# Patient Record
Sex: MALE | Race: WHITE | NOT HISPANIC OR LATINO | Employment: UNEMPLOYED | URBAN - METROPOLITAN AREA
[De-identification: names, ages, dates, MRNs, and addresses within clinical notes are randomized per-mention and may not be internally consistent; named-entity substitution may affect disease eponyms.]

---

## 2023-08-03 ENCOUNTER — APPOINTMENT (EMERGENCY)
Dept: RADIOLOGY | Facility: HOSPITAL | Age: 28
End: 2023-08-03
Payer: COMMERCIAL

## 2023-08-03 ENCOUNTER — HOSPITAL ENCOUNTER (EMERGENCY)
Facility: HOSPITAL | Age: 28
Discharge: HOME | End: 2023-08-03
Attending: EMERGENCY MEDICINE
Payer: COMMERCIAL

## 2023-08-03 VITALS
HEART RATE: 60 BPM | RESPIRATION RATE: 18 BRPM | WEIGHT: 209.4 LBS | SYSTOLIC BLOOD PRESSURE: 101 MMHG | DIASTOLIC BLOOD PRESSURE: 57 MMHG | OXYGEN SATURATION: 98 % | TEMPERATURE: 97.9 F

## 2023-08-03 DIAGNOSIS — L03.116 CELLULITIS OF LEFT FOOT: ICD-10-CM

## 2023-08-03 DIAGNOSIS — L03.115 CELLULITIS OF RIGHT FOOT: Primary | ICD-10-CM

## 2023-08-03 PROCEDURE — 73630 X-RAY EXAM OF FOOT: CPT

## 2023-08-03 PROCEDURE — 99284 EMERGENCY DEPT VISIT MOD MDM: CPT | Performed by: EMERGENCY MEDICINE

## 2023-08-03 PROCEDURE — 99282 EMERGENCY DEPT VISIT SF MDM: CPT

## 2023-08-03 RX ORDER — ACETAMINOPHEN 500 MG
500 TABLET ORAL EVERY 6 HOURS PRN
Qty: 30 TABLET | Refills: 0 | Status: SHIPPED | OUTPATIENT
Start: 2023-08-03

## 2023-08-03 RX ORDER — SULFAMETHOXAZOLE AND TRIMETHOPRIM 800; 160 MG/1; MG/1
1 TABLET ORAL ONCE
Status: COMPLETED | OUTPATIENT
Start: 2023-08-03 | End: 2023-08-03

## 2023-08-03 RX ORDER — ACETAMINOPHEN 325 MG/1
650 TABLET ORAL ONCE
Status: COMPLETED | OUTPATIENT
Start: 2023-08-03 | End: 2023-08-03

## 2023-08-03 RX ORDER — SULFAMETHOXAZOLE AND TRIMETHOPRIM 800; 160 MG/1; MG/1
1 TABLET ORAL 2 TIMES DAILY
Qty: 14 TABLET | Refills: 0 | Status: SHIPPED | OUTPATIENT
Start: 2023-08-03 | End: 2023-08-10

## 2023-08-03 RX ADMIN — ACETAMINOPHEN 650 MG: 325 TABLET ORAL at 22:00

## 2023-08-03 RX ADMIN — SULFAMETHOXAZOLE AND TRIMETHOPRIM 1 TABLET: 800; 160 TABLET ORAL at 22:00

## 2023-08-04 NOTE — ED PROVIDER NOTES
History  Chief Complaint   Patient presents with   • Wound Check     Pt came to ER with wounds on b/l feet and foot pain. Pt reports he was relapsed on drugs after being sober for six years. Pt has been injecting fentanyl into his feet. Pt reports he uses 3-4 bags per day. 25-year-old male with history of IV drug abuse presents complaining of bilateral redness of his feet and pain. Patient states that he recently relapsed approximately month ago and has been injecting into his toes and noticed redness about 3 or 4 days ago. Denies fevers. Denies any shortness of breath chest pain or calf pain. Patient states that he used  just prior to arrival. Denies any other complaints       History provided by:  Patient   used: No        Prior to Admission Medications   Prescriptions Last Dose Informant Patient Reported? Taking? Riboflavin 400 MG CAPS   No No   Sig: Take 400 mg by mouth daily. acetaminophen (TYLENOL) 325 mg tablet   No No   Sig: Take 2 tablets (650 mg total) by mouth every 6 (six) hours as needed for mild pain or fever. magnesium oxide (MAG-OX) 400 mg   No No   Sig: Take 1 tablet (400 mg total) by mouth daily. Facility-Administered Medications: None       Past Medical History:   Diagnosis Date   • Migraines    • Psychiatric disorder        Past Surgical History:   Procedure Laterality Date   • NO PAST SURGERIES         History reviewed. No pertinent family history. I have reviewed and agree with the history as documented. E-Cigarette/Vaping     E-Cigarette/Vaping Substances     Social History     Tobacco Use   • Smoking status: Every Day     Packs/day: 0.50     Types: Cigarettes     Passive exposure: Yes   Substance Use Topics   • Alcohol use: No   • Drug use: Yes     Types: Fentanyl     Comment: 3-4 bags per day IV       Review of Systems   Constitutional: Negative. Negative for chills and fatigue. HENT: Negative for ear pain and sore throat.     Eyes: Negative for photophobia and redness. Respiratory: Negative for apnea, cough and shortness of breath. Cardiovascular: Negative for chest pain. Gastrointestinal: Negative for abdominal pain, nausea and vomiting. Genitourinary: Negative for dysuria. Musculoskeletal: Negative for arthralgias, neck pain and neck stiffness. Skin: Positive for wound. Negative for rash. Neurological: Negative for dizziness, tremors, syncope and weakness. Psychiatric/Behavioral: Negative for suicidal ideas. Physical Exam  Physical Exam  Constitutional:       General: He is not in acute distress. Appearance: He is well-developed. He is not diaphoretic. Eyes:      Pupils: Pupils are equal, round, and reactive to light. Cardiovascular:      Rate and Rhythm: Normal rate and regular rhythm. Pulmonary:      Effort: Pulmonary effort is normal. No respiratory distress. Breath sounds: Normal breath sounds. Abdominal:      General: Bowel sounds are normal. There is no distension. Palpations: Abdomen is soft. Musculoskeletal:         General: Normal range of motion. Cervical back: Normal range of motion and neck supple. Skin:     General: Skin is warm and dry. Findings: Erythema present. Comments: Bilateral erythema with mild edema noted as well as multiple track marks from prior injections. No crepitus. Dorsalis pedis pulse 2+. Redness does not extend past the foot. Neurological:      Mental Status: He is alert and oriented to person, place, and time.          Vital Signs  ED Triage Vitals [08/03/23 2109]   Temperature Pulse Respirations Blood Pressure SpO2   97.9 °F (36.6 °C) 90 20 104/70 94 %      Temp Source Heart Rate Source Patient Position - Orthostatic VS BP Location FiO2 (%)   Tympanic Monitor Sitting Left arm --      Pain Score       --           Vitals:    08/03/23 2109   BP: 104/70   Pulse: 90   Patient Position - Orthostatic VS: Sitting         Visual Acuity      ED Medications  Medications   sulfamethoxazole-trimethoprim (BACTRIM DS) 800-160 mg per tablet 1 tablet (1 tablet Oral Given 8/3/23 2200)   acetaminophen (TYLENOL) tablet 650 mg (650 mg Oral Given 8/3/23 2200)       Diagnostic Studies  Results Reviewed     None                 XR foot 3+ views RIGHT   ED Interpretation by Marc Field PA-C (08/03 2259)   No obvious evidence of deep space infection      XR foot 3+ views LEFT   ED Interpretation by Marc Field PA-C (08/03 2259)   No obvious evidence of deep space infection                 Procedures  Procedures         ED Course                               SBIRT 22yo+    Flowsheet Row Most Recent Value   Initial Alcohol Screen: US AUDIT-C     1. How often do you have a drink containing alcohol? 0 Filed at: 08/03/2023 2203   2. How many drinks containing alcohol do you have on a typical day you are drinking? 0 Filed at: 08/03/2023 2203   3a. Male UNDER 65: How often do you have five or more drinks on one occasion? 0 Filed at: 08/03/2023 2203   Audit-C Score 0 Filed at: 08/03/2023 2203   JERZY: How many times in the past year have you. .. Used an illegal drug or used a prescription medication for non-medical reasons? Daily or Almost Daily Filed at: 08/03/2023 2203                    Medical Decision Making  Patient had evidence of bilateral cellulitis. Necrotizing infection was considered however no air found on x-ray. Low concern for osteomyelitis with negative x-ray and superficial wounds. Started on Bactrim for suspected MRSA. Patient did not meet SIRS criteria, no clear role for further evaluation. Patient was educated on supportive care. Warm handoff placed for substance abuse. Given return precautions. Discharged home. Amount and/or Complexity of Data Reviewed  Radiology: ordered. Risk  OTC drugs. Prescription drug management.           Disposition  Final diagnoses:   Cellulitis of right foot   Cellulitis of left foot     Time reflects when diagnosis was documented in both MDM as applicable and the Disposition within this note     Time User Action Codes Description Comment    8/3/2023 11:00 PM Sheila Lee [I95.17] Cellulitis     8/3/2023 11:00  [address] [E15.19] Cellulitis     8/3/2023 11:00 PM Ronnie Flynn Add [J01.445] Cellulitis of right foot     8/3/2023 11:00 PM Ronnie Flynn Add [O24.546] Cellulitis of left foot       ED Disposition     ED Disposition   Discharge    Condition   Stable    Date/Time   Thu Aug 3, 2023 11:00 PM    [facility] discharge to home/self care. Follow-up Information     Follow up With Specialties Details Why Contact Info Additional [address] [facility] Emergency Department Emergency Medicine Go to  If symptoms worsen [address] [phone] 4727 [facility] Emergency Department          Patient's Medications   Discharge Prescriptions    ACETAMINOPHEN (TYLENOL) 500 MG TABLET    Take 1 tablet (500 mg total) by mouth every 6 (six) hours as needed for moderate pain       Start Date: 8/3/2023  End Date: --       Order Dose: 500 mg       Quantity: 30 tablet    Refills: 0    SULFAMETHOXAZOLE-TRIMETHOPRIM (BACTRIM DS) 800-160 MG PER TABLET    Take 1 tablet by mouth 2 (two) times a day for 7 days smx-tmp DS (BACTRIM) 800-160 mg tabs (1tab q12 D10)       Start Date: 8/3/2023  End Date: 8/10/2023       Order Dose: 1 tablet       Quantity: 14 tablet    Refills: 0       No discharge procedures on file.     PDMP Review     None          ED Provider  Electronically Signed by           Shayne Houser PA-C  08/03/23 2963

## 2023-08-04 NOTE — DISCHARGE INSTRUCTIONS
Take medications as directed. Complete entire course. Return for new or worsening complaints including pain and fever. Injecting into this area will cause worsening of your infection and can prolong the course of your infection.

## 2023-08-06 NOTE — ED CARE HANDOFF
[address] [facility] Warm Handoff Outcome Note    Patient name Yonathan Alvarez  Location ED 06/ED 06 MRN [id number]  Age: 25 y.o. Plan Type:   Warm Handoff                                                                                    Plan Date: 8/6/2023  Service:  ED Warm Handoff      Substance Use History:  Fentanyl    Warm Handoff Update:  Pt discharged    Warm Handoff Outcome: Patient Refused

## 2023-08-30 ENCOUNTER — HOSPITAL ENCOUNTER (EMERGENCY)
Facility: HOSPITAL | Age: 28
Discharge: HOME | End: 2023-08-30
Attending: EMERGENCY MEDICINE
Payer: COMMERCIAL

## 2023-08-30 VITALS
HEART RATE: 70 BPM | SYSTOLIC BLOOD PRESSURE: 122 MMHG | RESPIRATION RATE: 18 BRPM | OXYGEN SATURATION: 100 % | TEMPERATURE: 97 F | DIASTOLIC BLOOD PRESSURE: 77 MMHG

## 2023-08-30 DIAGNOSIS — Z51.89 VISIT FOR WOUND CHECK: Primary | ICD-10-CM

## 2023-08-30 PROCEDURE — 99282 EMERGENCY DEPT VISIT SF MDM: CPT

## 2023-08-30 PROCEDURE — 99283 EMERGENCY DEPT VISIT LOW MDM: CPT | Performed by: EMERGENCY MEDICINE

## 2023-08-30 RX ORDER — GINSENG 100 MG
1 CAPSULE ORAL 2 TIMES DAILY
Qty: 28 G | Refills: 0 | Status: SHIPPED | OUTPATIENT
Start: 2023-08-30

## 2023-08-30 NOTE — ED PROVIDER NOTES
History  Chief Complaint   Patient presents with   • Wound Check     Pt with wound on medial side right foot. Pt reports he has had 2 course of abx for wound and it is improving but the wound hasn't closed yet. Pt interested in would care services. 26 yo M here with multiple feet wounds, s/p antibiotics here for wound check. No new pain or drainage. Also notes needs help getting off opioids. Recently relapsed after being clean for 6 years, placed on methadone but wants to get off - went to rehab but says second day off of methadone, had intense vomiting and was admitted to [address] [place] [address]          Prior to Admission Medications   Prescriptions Last Dose Informant Patient Reported? Taking? Riboflavin 400 MG CAPS   No No   Sig: Take 400 mg by mouth daily. acetaminophen (TYLENOL) 325 mg tablet   No No   Sig: Take 2 tablets (650 mg total) by mouth every 6 (six) hours as needed for mild pain or fever. acetaminophen (TYLENOL) 500 mg tablet   No No   Sig: Take 1 tablet (500 mg total) by mouth every 6 (six) hours as needed for moderate pain   magnesium oxide (MAG-OX) 400 mg   No No   Sig: Take 1 tablet (400 mg total) by mouth daily. Facility-Administered Medications: None       Past Medical History:   Diagnosis Date   • Migraines    • Psychiatric disorder        Past Surgical History:   Procedure Laterality Date   • NO PAST SURGERIES         History reviewed. No pertinent family history. I have reviewed and agree with the history as documented. E-Cigarette/Vaping     E-Cigarette/Vaping Substances     Social History     Tobacco Use   • Smoking status: Every Day     Packs/day: 0.50     Types: Cigarettes     Passive exposure: Yes   Substance Use Topics   • Alcohol use: No   • Drug use: Not Currently     Types: Fentanyl     Comment: hx of IV drug use, now on methadone       Review of Systems   Constitutional: Negative for chills and fever. HENT: Negative for ear pain and sore throat.     Eyes: Negative for pain and visual disturbance. Respiratory: Negative for cough and shortness of breath. Cardiovascular: Negative for chest pain and palpitations. Gastrointestinal: Negative for abdominal pain and vomiting. Genitourinary: Negative for dysuria and hematuria. Musculoskeletal: Negative for arthralgias and back pain. Skin: Positive for wound. Negative for color change and rash. Neurological: Negative for seizures and syncope. All other systems reviewed and are negative. Physical Exam  Physical Exam  Vitals and nursing note reviewed. Constitutional:       General: He is not in acute distress. Appearance: He is well-developed. HENT:      Head: Normocephalic and atraumatic. Eyes:      Conjunctiva/sclera: Conjunctivae normal.   Cardiovascular:      Rate and Rhythm: Normal rate and regular rhythm. Heart sounds: No murmur heard. Pulmonary:      Effort: Pulmonary effort is normal. No respiratory distress. Breath sounds: Normal breath sounds. Abdominal:      Palpations: Abdomen is soft. Tenderness: There is no abdominal tenderness. Musculoskeletal:         General: No swelling. Cervical back: Neck supple. Comments: Bilateral dorsal feet ulcerations that appear nonpurulent, nonerythematous. Skin:     General: Skin is warm and dry. Capillary Refill: Capillary refill takes less than 2 seconds. Neurological:      Mental Status: He is alert.    Psychiatric:         Mood and Affect: Mood normal.         Vital Signs  ED Triage Vitals [08/30/23 1817]   Temperature Pulse Respirations Blood Pressure SpO2   (!) 97 °F (36.1 °C) 70 18 122/77 100 %      Temp Source Heart Rate Source Patient Position - Orthostatic VS BP Location FiO2 (%)   Tympanic Monitor Sitting Left arm --      Pain Score       No Pain           Vitals:    08/30/23 1817   BP: 122/77   Pulse: 70   Patient Position - Orthostatic VS: Sitting         Visual Acuity      ED Medications  Medications - No data to display    Diagnostic Studies  Results Reviewed     None                 No orders to display              Procedures  Procedures         ED Course  ED Course as of 08/30/23 1921   Wed Aug 30, 2023   1907 Patient notes that he is on methadone and wants to get off and get on Suboxone. Recently went to rehab but after not taking methadone for 1 day had severe vomiting and had to be admitted to [facility] care stepdown unit. Continues to be on methadone, last dose today. Medical Decision Making  Patient with wounds that appear to be not acutely infected. Applied cream to encourage healing, wound care consult placed. Reached out to detox PA Charles Strong to see if he is a good candidate for inpatient detox but patient soon left secondary to having his car towed. Risk  OTC drugs. Disposition  Final diagnoses:   Visit for wound check     Time reflects when diagnosis was documented in both MDM as applicable and the Disposition within this note     Time User Action Codes Description Comment    8/30/2023  6:40 PM Mingo Khan Esperanza [Z51.89] Visit for wound check       ED Disposition     ED Disposition   Discharge    Condition   Stable    Date/Time   Wed Aug 30, 2023  6:40 PM    Comment   Anahi Huston discharge to home/self care.                Follow-up Information     Follow up With Specialties Details Why Contact Info Additional [facility]   [address] [id number]  [address] [facility], [address] [place] [address], [address] [place] [address]          Patient's Medications   Discharge Prescriptions    BACITRACIN TOPICAL OINTMENT 500 UNITS/G TOPICAL OINTMENT    Apply 1 large application topically 2 (two) times a day       Start Date: 8/30/2023 End Date: --       Order Dose: 1 large application       Quantity: 28 g    Refills: 0           PDMP Review     None          ED Provider  Electronically Signed by           Lloyd Martin MD  08/30/23 6434

## 2023-08-31 NOTE — ED CARE HANDOFF
[address] [facility] Warm Handoff Outcome Note    Patient name Cesar Woods  Location ED 03/ED 03 MRN [id number]  Age: 25 y.o. Plan Type: Warm Handoff                                                                                    Plan Date: 8/30/2023  Service:  ED Warm Handoff      Substance Use History:  Heroin    Warm Handoff Update:  Discharged, HOST to follow up with patient.     Warm Handoff Outcome: Treatment Related Resources

## 2023-10-13 ENCOUNTER — HOSPITAL ENCOUNTER (EMERGENCY)
Facility: HOSPITAL | Age: 28
Discharge: HOME | End: 2023-10-13
Attending: EMERGENCY MEDICINE
Payer: COMMERCIAL

## 2023-10-13 VITALS
SYSTOLIC BLOOD PRESSURE: 154 MMHG | TEMPERATURE: 97.4 F | RESPIRATION RATE: 16 BRPM | OXYGEN SATURATION: 100 % | DIASTOLIC BLOOD PRESSURE: 88 MMHG | HEART RATE: 76 BPM

## 2023-10-13 DIAGNOSIS — F11.93 OPIOID WITHDRAWAL (HCC): Primary | ICD-10-CM

## 2023-10-13 PROCEDURE — 99284 EMERGENCY DEPT VISIT MOD MDM: CPT | Performed by: EMERGENCY MEDICINE

## 2023-10-13 RX ORDER — CLONIDINE HYDROCHLORIDE 0.1 MG/1
0.2 TABLET ORAL ONCE
Status: COMPLETED | OUTPATIENT
Start: 2023-10-13 | End: 2023-10-13

## 2023-10-13 RX ADMIN — CLONIDINE HYDROCHLORIDE 0.2 MG: 0.1 TABLET ORAL at 15:05

## 2023-10-13 NOTE — ED PROVIDER NOTES
History  Chief Complaint   Patient presents with    Detox Evaluation     Patient arrives reporting w/d from Xylazine, fentanyl, heroin, and benzos; last used last night; in custody of police; Methadone maintenance, missed 2 days     HPI  Patient is a 59-year-old male with history of drug abuse presenting with withdrawal symptoms. Patient states that he uses xylazine, fentanyl, heroin as well as benzos. His last use was last night around 11 PM.  Patient states that he was on methadone but missed the past 2 days. Currently experiencing tremors, diaphoresis, congestion. Also feeling nauseous and had episodes of diarrhea. Reports no fever, chills, chest pain, shortness of breath, abdominal pain. Prior to Admission Medications   Prescriptions Last Dose Informant Patient Reported? Taking?   acetaminophen (TYLENOL) 500 mg tablet   No No   Sig: Take 1 tablet (500 mg total) by mouth every 6 (six) hours as needed for moderate pain   bacitracin topical ointment 500 units/g topical ointment   No No   Sig: Apply 1 large application topically 2 (two) times a day      Facility-Administered Medications: None       Past Medical History:   Diagnosis Date    Migraines     Psychiatric disorder        Past Surgical History:   Procedure Laterality Date    NO PAST SURGERIES         History reviewed. No pertinent family history. I have reviewed and agree with the history as documented. E-Cigarette/Vaping     E-Cigarette/Vaping Substances     Social History     Tobacco Use    Smoking status: Every Day     Packs/day: 0.50     Types: Cigarettes     Passive exposure: Yes   Substance Use Topics    Alcohol use: No    Drug use: Yes     Types: Fentanyl, Heroin, Benzodiazepines, Other       Review of Systems   Constitutional:  Positive for diaphoresis. Negative for chills, fever and unexpected weight change. HENT:  Negative for ear pain and sore throat. Eyes:  Negative for visual disturbance.    Respiratory:  Negative for cough, chest tightness and shortness of breath. Cardiovascular:  Negative for chest pain and leg swelling. Gastrointestinal:  Positive for diarrhea and nausea. Negative for abdominal distention, abdominal pain, constipation and vomiting. Endocrine: Negative. Genitourinary:  Negative for difficulty urinating and dysuria. Musculoskeletal: Negative. Skin: Negative. Allergic/Immunologic: Negative. Neurological:  Positive for tremors. Hematological: Negative. Psychiatric/Behavioral: Negative. All other systems reviewed and are negative. Physical Exam  Physical Exam  Vitals and nursing note reviewed. Constitutional:       General: He is not in acute distress. Appearance: Normal appearance. He is diaphoretic. He is not ill-appearing. HENT:      Head: Normocephalic and atraumatic. Right Ear: External ear normal.      Left Ear: External ear normal.      Nose: Nose normal.      Mouth/Throat:      Mouth: Mucous membranes are moist.      Pharynx: Oropharynx is clear. Eyes:      General: No scleral icterus. Right eye: No discharge. Left eye: No discharge. Extraocular Movements: Extraocular movements intact. Conjunctiva/sclera: Conjunctivae normal.      Pupils: Pupils are equal, round, and reactive to light. Cardiovascular:      Rate and Rhythm: Normal rate and regular rhythm. Pulses: Normal pulses. Heart sounds: Normal heart sounds. Pulmonary:      Effort: Pulmonary effort is normal.      Breath sounds: Normal breath sounds. Abdominal:      General: Abdomen is flat. Bowel sounds are normal. There is no distension. Palpations: Abdomen is soft. Tenderness: There is no abdominal tenderness. There is no guarding or rebound. Musculoskeletal:         General: Normal range of motion. Cervical back: Normal range of motion and neck supple. Skin:     General: Skin is warm.       Capillary Refill: Capillary refill takes less than 2 seconds. Neurological:      General: No focal deficit present. Mental Status: He is alert and oriented to person, place, and time. Mental status is at baseline. Psychiatric:         Mood and Affect: Mood normal.         Behavior: Behavior normal.         Thought Content: Thought content normal.         Judgment: Judgment normal.         Vital Signs  ED Triage Vitals [10/13/23 1439]   Temperature Pulse Respirations Blood Pressure SpO2   (!) 97.4 °F (36.3 °C) 70 12 93/52 100 %      Temp Source Heart Rate Source Patient Position - Orthostatic VS BP Location FiO2 (%)   Oral Monitor Lying Left arm --      Pain Score       No Pain           Vitals:    10/13/23 1439 10/13/23 1500 10/13/23 1530   BP: 93/52 123/75 154/88   Pulse: 70 73 76   Patient Position - Orthostatic VS: Lying Lying Lying         Visual Acuity      ED Medications  Medications   cloNIDine (CATAPRES) tablet 0.2 mg (0.2 mg Oral Given 10/13/23 1505)       Diagnostic Studies  Results Reviewed       None                   No orders to display              Procedures  Procedures         ED Course                               SBIRT 22yo+      Flowsheet Row Most Recent Value   Initial Alcohol Screen: US AUDIT-C     1. How often do you have a drink containing alcohol? 0 Filed at: 10/13/2023 1440   2. How many drinks containing alcohol do you have on a typical day you are drinking? 0 Filed at: 10/13/2023 1440   3a. Male UNDER 65: How often do you have five or more drinks on one occasion? 0 Filed at: 10/13/2023 1440   3b. FEMALE Any Age, or MALE 65+: How often do you have 4 or more drinks on one occassion? 0 Filed at: 10/13/2023 1440   Audit-C Score 0 Filed at: 10/13/2023 1440   JERZY: How many times in the past year have you. .. Used an illegal drug or used a prescription medication for non-medical reasons? Daily or Almost Daily Filed at: 10/13/2023 1440   DAST-10: In the past 12 months. ..    1. Have you used drugs other than those required for medical reasons? 1 Filed at: 10/13/2023 1440   2. Do you use more than one drug at a time? 1 Filed at: 10/13/2023 1440   3. Have you had medical problems as a result of your drug use (e.g., memory loss, hepatitis, convulsions, bleeding, etc.)? 1 Filed at: 10/13/2023 1440   4. Have you had "blackouts" or "flashbacks" as a result of drug use? YesNo 1 Filed at: 10/13/2023 1440   5. Do you ever feel bad or guilty about your drug use? 0 Filed at: 10/13/2023 1440   6. Does your spouse (or parent) ever complain about your involvement with drugs? 0 Filed at: 10/13/2023 1440   7. Have you neglected your family because of your use of drugs? 0 Filed at: 10/13/2023 1440   8. Have you engaged in illegal activities in order to obtain drugs? 0 Filed at: 10/13/2023 1440   9. Have you ever experienced withdrawal symptoms (felt sick) when you stopped taking drugs? 1 Filed at: 10/13/2023 1440   10. Are you always able to stop using drugs when you want to? 1 Filed at: 10/13/2023 1440   DAST-10 Score 6 Filed at: 10/13/2023 1440                      Medical Decision Making   Patient is a 55-year-old male presenting with opioid withdrawal symptoms  Patient is diaphoretic as well as have congestion  Patient given clonidine with complete symptom resolution  Patient was stable for incarceration  Patient discharged    Problems Addressed:  Opioid withdrawal [facility]): acute illness or injury    Risk  Prescription drug management. Disposition  Final diagnoses:   Opioid withdrawal ([address])     Time reflects when diagnosis was documented in both MDM as applicable and the Disposition within this note       Time User Action Codes Description Comment    10/13/2023  3:42 PM Majo Mata [F11.93] Opioid withdrawal [facility])           ED Disposition       ED Disposition   Discharge    Condition   Stable    Date/Time   Fri Oct 13, 2023 [place] discharge to home/self care.                    Follow-up Information       Follow up With Specialties Details Why Contact Info Additional [address] [facility] Emergency Department Emergency Medicine Go to  If symptoms worsen [address] [facility] [phone] [address] [facility] Emergency Department            Discharge Medication List as of 10/13/2023  3:52 PM        CONTINUE these medications which have NOT CHANGED    Details   acetaminophen (TYLENOL) 500 mg tablet Take 1 tablet (500 mg total) by mouth every 6 (six) hours as needed for moderate pain, Starting Thu 8/3/2023, Normal      bacitracin topical ointment 500 units/g topical ointment Apply 1 large application topically 2 (two) times a day, Starting Wed 8/30/2023, Normal             No discharge procedures on file.     PDMP Review         Value Time User    PDMP Reviewed  Yes 8/30/2023  7:32 PM Palma Saleh PA-C            ED Provider  Electronically Signed by             Jack Thomason MD  10/13/23 2002

## 2024-01-10 ENCOUNTER — HOSPITAL ENCOUNTER (EMERGENCY)
Facility: HOSPITAL | Age: 29
Discharge: HOME | End: 2024-01-10
Attending: EMERGENCY MEDICINE
Payer: COMMERCIAL

## 2024-01-10 VITALS
DIASTOLIC BLOOD PRESSURE: 83 MMHG | HEART RATE: 106 BPM | SYSTOLIC BLOOD PRESSURE: 147 MMHG | TEMPERATURE: 98.5 F | RESPIRATION RATE: 18 BRPM | WEIGHT: 200.5 LBS | OXYGEN SATURATION: 96 %

## 2024-01-10 DIAGNOSIS — R42 LIGHTHEADEDNESS: Primary | ICD-10-CM

## 2024-01-10 DIAGNOSIS — M54.9 BACK PAIN: ICD-10-CM

## 2024-01-10 PROCEDURE — 99283 EMERGENCY DEPT VISIT LOW MDM: CPT

## 2024-01-11 NOTE — ED PROVIDER NOTES
"History  Chief Complaint   Patient presents with    Dizziness     Pt reports he had a fall on 1/4 and since then has been experiencing light headedness when he stands and \"my vision gets darker and constricted\".      The patient is a 28-year-old male with a past medical history of migraines, who presents for evaluation of lightheadedness.  Per record review he was seen at [facility] on 1/4 after a slip on water with head strike and LOC.  Imaging of the head, c-spine, and t-spine were negative at that time.  He reports developing neck pain and stiffness, back pain, headaches, lightheadedness, and a visual disturbance since that time.  He explains when he stands up after sitting/laying for several minutes he experiences lightheadedness and \"the room closes in around me\".  No paresthesias, saddle anesthesia, extremity weakness, bowel/bladder incontinence, nausea, or vomiting.  The patient is scheduled to follow up with neurology at [facility] on Friday.        Prior to Admission Medications   Prescriptions Last Dose Informant Patient Reported? Taking?   acetaminophen (TYLENOL) 500 mg tablet   No No   Sig: Take 1 tablet (500 mg total) by mouth every 6 (six) hours as needed for moderate pain   bacitracin topical ointment 500 units/g topical ointment   No No   Sig: Apply 1 large application topically 2 (two) times a day      Facility-Administered Medications: None       Past Medical History:   Diagnosis Date    Migraines     Psychiatric disorder        Past Surgical History:   Procedure Laterality Date    NO PAST SURGERIES         History reviewed. No pertinent family history.  I have reviewed and agree with the history as documented.    E-Cigarette/Vaping     E-Cigarette/Vaping Substances     Social History     Tobacco Use    Smoking status: Every Day     Current packs/day: 0.50     Types: Cigarettes     Passive exposure: Yes   Substance Use Topics    Alcohol use: No    Drug use: Yes     Types: Fentanyl, Heroin, Benzodiazepines, " Other       Review of Systems   Constitutional:  Negative for chills and fever.   HENT:  Negative for ear pain and sore throat.    Eyes:  Positive for visual disturbance. Negative for pain.   Respiratory:  Negative for cough and shortness of breath.    Cardiovascular:  Negative for chest pain and palpitations.   Gastrointestinal:  Negative for abdominal pain, diarrhea, nausea and vomiting.   Genitourinary:  Negative for dysuria and hematuria.        -Incontinence   Musculoskeletal:  Positive for neck pain and neck stiffness. Negative for arthralgias and back pain.   Skin:  Negative for color change and rash.   Neurological:  Positive for light-headedness and headaches. Negative for dizziness, seizures, syncope, weakness and numbness.        - Saddle anesthesia   All other systems reviewed and are negative.      Physical Exam  Physical Exam  Vitals and nursing note reviewed.   Constitutional:       General: He is awake.      Appearance: Normal appearance. He is well-developed and normal weight. He is not toxic-appearing or diaphoretic.   HENT:      Head: Normocephalic and atraumatic. No raccoon eyes, Weiner's sign, abrasion, contusion or laceration.      Right Ear: Tympanic membrane, ear canal and external ear normal. No hemotympanum.      Left Ear: Tympanic membrane, ear canal and external ear normal. No hemotympanum.      Nose: Nose normal. No nasal deformity.      Right Nostril: No epistaxis or septal hematoma.      Left Nostril: No epistaxis or septal hematoma.      Mouth/Throat:      Lips: Pink.      Mouth: Mucous membranes are moist.      Dentition: Abnormal dentition.      Tongue: Tongue does not deviate from midline.      Pharynx: Oropharynx is clear. Uvula midline.      Comments: Recently repaired dental fractures on the central incisors.  Eyes:      General: Lids are normal. Vision grossly intact. Gaze aligned appropriately. No visual field deficit.     Extraocular Movements: Extraocular movements intact.       Right eye: No nystagmus.      Left eye: No nystagmus.      Conjunctiva/sclera: Conjunctivae normal.      Pupils: Pupils are equal, round, and reactive to light.      Visual Fields: Right eye visual fields normal and left eye visual fields normal.   Neck:      Meningeal: Brudzinski's sign absent.   Cardiovascular:      Rate and Rhythm: Normal rate and regular rhythm.      Heart sounds: S1 normal and S2 normal. No murmur heard.     No friction rub. No gallop.   Pulmonary:      Effort: Pulmonary effort is normal. No respiratory distress.      Breath sounds: Normal breath sounds and air entry. No wheezing, rhonchi or rales.   Chest:      Chest wall: No tenderness.   Abdominal:      General: Abdomen is flat.      Palpations: Abdomen is soft.      Tenderness: There is no abdominal tenderness. There is no guarding or rebound.   Musculoskeletal:      Cervical back: Full passive range of motion without pain and neck supple. No rigidity or crepitus. Spinous process tenderness present. No muscular tenderness.      Thoracic back: Bony tenderness present. No deformity, spasms or tenderness.      Lumbar back: No spasms, tenderness or bony tenderness.      Comments: Tenderness to palpation of the c-spine and t-spine with minimal tenderness of the paraspinal musculature.  No palpable spasms, step-off, or crepitus.  Full AROM and 5/5 in all extremities.  Raising the left leg reproduces thoracic back pain, but there is no tenderness in the lumbar region.  Sensation is intact.  2+ distal pulses.   Lymphadenopathy:      Cervical: No cervical adenopathy.   Skin:     General: Skin is warm and dry.      Capillary Refill: Capillary refill takes less than 2 seconds.      Coloration: Skin is not pale.      Findings: No abrasion, bruising, ecchymosis, erythema, signs of injury, laceration, rash or wound.   Neurological:      General: No focal deficit present.      Mental Status: He is alert and oriented to person, place, and time.       GCS: GCS eye subscore is 4. GCS verbal subscore is 5. GCS motor subscore is 6.      Cranial Nerves: Cranial nerves 2-12 are intact. No dysarthria or facial asymmetry.      Sensory: Sensation is intact.      Motor: Motor function is intact. No weakness, abnormal muscle tone or pronator drift.      Coordination: Coordination is intact. Romberg sign negative. Finger-Nose-Finger Test normal.      Gait: Gait is intact.   Psychiatric:         Speech: Speech normal.         Behavior: Behavior is cooperative.         Vital Signs  ED Triage Vitals [01/10/24 1947]   Temperature Pulse Respirations Blood Pressure SpO2   98.5 °F (36.9 °C) (!) 106 18 147/83 96 %      Temp Source Heart Rate Source Patient Position - Orthostatic VS BP Location FiO2 (%)   Tympanic Monitor Sitting Left arm --      Pain Score       --           Vitals:    01/10/24 1947   BP: 147/83   Pulse: (!) 106   Patient Position - Orthostatic VS: Sitting         Visual Acuity  Visual Acuity      Flowsheet Row Most Recent Value   L Pupil Size (mm) 3   R Pupil Size (mm) 3            ED Medications  Medications - No data to display    Diagnostic Studies  Results Reviewed       None            CT Head on 1/4/24  IMPRESSION:   No acute intracranial abnormality. Specifically, no acute intracranial   hemorrhage or large acute territorial infarction.       CT T-Spine on 1/4/24  IMPRESSION: No acute fracture or dislocation.           MRI C-Spine on 1/5/24  Impression:  No MRI evidence of traumatic injury or other significant pathology of the   cervical spine identified.         Procedures  Procedures         ED Course           Medical Decision Making  Patient presents with lightheadedness, headaches, and persistent neck/upper back pain after hitting his head on a counter on 1/4.  He is alert and oriented x 4 with a GCS of 15 and no focal neurologic deficits.  There is tenderness to palpation of the C-spine and upper T-spine, but no step-offs or vertebral deformities.   All extremities are neurovascularly intact.  Differential diagnosis includes but is not limited to posttraumatic headache, tension headache, migraine, concussion syndrome, cervical radiculopathy, C-spine fracture, T-spine fracture, or musculoskeletal pain.  Reviewed imaging done at [facility] on 1/4 including CTs of the head, C-spine, and T-spine, as well as an MRI of the C-spine.  All imaging was negative for acute pathology.  Reassured the patient that his neurologic exam was normal today and  repeat imaging is not warranted at this time.  He can continue to take the anti-inflammatories and muscle relaxants for his back pain, and should follow-up with neurology on Friday.  Patient is in agreement with the treatment plan and all questions were answered.  Strict return precautions discussed and he verbalized understanding.  Follow-up with neurology as scheduled, but return to the ED in the interim with any new or worsening symptoms.    Problems Addressed:  Back pain: acute illness or injury  Lightheadedness: acute illness or injury    Amount and/or Complexity of Data Reviewed  External Data Reviewed: radiology and notes.             Disposition  Final diagnoses:   Lightheadedness   Back pain     Time reflects when diagnosis was documented in both MDM as applicable and the Disposition within this note       Time User Action Codes Description Comment    1/10/2024  8:22 PM Patti Zaragoza Add [R42] Lightheadedness     1/10/2024  8:23 PM Patti Zaragoza Add [M54.9] Back pain           ED Disposition       ED Disposition   Discharge    Condition   Stable    Date/Time   Wed Abdi 10, 2024  8:22 PM    Comment   Deniz Tate discharge to home/self care.                   Follow-up Information    None         Discharge Medication List as of 1/10/2024  8:23 PM        CONTINUE these medications which have NOT CHANGED    Details   acetaminophen (TYLENOL) 500 mg tablet Take 1 tablet (500 mg total) by mouth every 6 (six) hours as  needed for moderate pain, Starting Thu 8/3/2023, Normal      bacitracin topical ointment 500 units/g topical ointment Apply 1 large application topically 2 (two) times a day, Starting Wed 8/30/2023, Normal             No discharge procedures on file.    PDMP Review         Value Time User    PDMP Reviewed  Yes 8/30/2023  7:32 PM Cassandra Hernandez PA-C            ED Provider  Electronically Signed by             Patti Zaragoza PA-C  01/11/24 0413

## 2024-07-19 ENCOUNTER — HOSPITAL ENCOUNTER (EMERGENCY)
Facility: HOSPITAL | Age: 29
Discharge: HOME | End: 2024-07-19
Attending: EMERGENCY MEDICINE
Payer: COMMERCIAL

## 2024-07-19 VITALS
SYSTOLIC BLOOD PRESSURE: 121 MMHG | RESPIRATION RATE: 18 BRPM | WEIGHT: 195.99 LBS | OXYGEN SATURATION: 100 % | TEMPERATURE: 98.7 F | DIASTOLIC BLOOD PRESSURE: 86 MMHG | HEART RATE: 96 BPM

## 2024-07-19 DIAGNOSIS — F11.20 METHADONE DEPENDENCE (HCC): Primary | ICD-10-CM

## 2024-07-19 DIAGNOSIS — Z76.0 ENCOUNTER FOR MEDICATION REFILL: ICD-10-CM

## 2024-07-19 PROCEDURE — 99281 EMR DPT VST MAYX REQ PHY/QHP: CPT

## 2024-07-19 PROCEDURE — 99284 EMERGENCY DEPT VISIT MOD MDM: CPT

## 2024-07-19 RX ORDER — METHADONE HYDROCHLORIDE 10 MG/ML
120 CONCENTRATE ORAL ONCE
Status: COMPLETED | OUTPATIENT
Start: 2024-07-19 | End: 2024-07-19

## 2024-07-19 RX ADMIN — METHADONE HYDROCHLORIDE 120 MG: 10 CONCENTRATE ORAL at 10:05

## 2024-07-19 NOTE — ED PROVIDER NOTES
History  Chief Complaint   Patient presents with    Medication Refill     Patient requesting his methadone dose as he has no transportation to his clinic today     29 M PMH of opioid use disorder on methadone maintenance therapy presents to ED for dose of methadone. Unable to obtain transportation to methadone clinic. Last dose yesterday. Denies symptoms or opioid use.     [facility] DAREN Anderson.       History provided by:  Patient (clin)  Medication Refill      Prior to Admission Medications   Prescriptions Last Dose Informant Patient Reported? Taking?   acetaminophen (TYLENOL) 500 mg tablet   No No   Sig: Take 1 tablet (500 mg total) by mouth every 6 (six) hours as needed for moderate pain   bacitracin topical ointment 500 units/g topical ointment   No No   Sig: Apply 1 large application topically 2 (two) times a day      Facility-Administered Medications: None       Past Medical History:   Diagnosis Date    Migraines     Psychiatric disorder        Past Surgical History:   Procedure Laterality Date    NO PAST SURGERIES         History reviewed. No pertinent family history.  I have reviewed and agree with the history as documented.    E-Cigarette/Vaping     E-Cigarette/Vaping Substances     Social History     Tobacco Use    Smoking status: Every Day     Current packs/day: 0.50     Types: Cigarettes     Passive exposure: Yes   Substance Use Topics    Alcohol use: No    Drug use: Yes     Types: Fentanyl, Heroin, Benzodiazepines, Other       Review of Systems   All other systems reviewed and are negative.      Physical Exam  Physical Exam  Vitals and nursing note reviewed.   Constitutional:       General: He is awake. He is not in acute distress.     Appearance: Normal appearance.   HENT:      Head: Normocephalic.      Mouth/Throat:      Lips: Pink.      Mouth: Mucous membranes are moist.   Eyes:      General: Vision grossly intact.   Cardiovascular:      Rate and Rhythm: Normal rate and regular  rhythm.      Heart sounds: Normal heart sounds.   Pulmonary:      Effort: Pulmonary effort is normal. No respiratory distress.      Breath sounds: Normal breath sounds.   Abdominal:      General: There is no distension.   Skin:     General: Skin is warm and dry.   Neurological:      Mental Status: He is alert.      Gait: Gait normal.         Vital Signs  ED Triage Vitals   Temperature Pulse Respirations Blood Pressure SpO2   07/19/24 0933 07/19/24 0933 07/19/24 0933 07/19/24 0933 07/19/24 0933   98.7 °F (37.1 °C) 96 18 121/86 100 %      Temp Source Heart Rate Source Patient Position - Orthostatic VS BP Location FiO2 (%)   07/19/24 0933 07/19/24 0933 07/19/24 0933 07/19/24 0933 --   Oral Monitor Sitting Left arm       Pain Score       07/19/24 1005       Med Not Given for Pain - for MAR use only           Vitals:    07/19/24 0933   BP: 121/86   Pulse: 96   Patient Position - Orthostatic VS: Sitting         Visual Acuity      ED Medications  Medications   methadone (DOLOPHINE) oral concentrated solution 120 mg (120 mg Oral Given 7/19/24 1005)       Diagnostic Studies  Results Reviewed       None                   No orders to display              Procedures  Procedures         ED Course  ED Course as of 07/19/24 1012   Fri Jul 19, 2024   0950 Methadone verified by Luba at [facility]  Per Luba:  Last dose 7/18/24 @0926  120 mg     Candice Funk, RN                                   SBIRT 20yo+      Flowsheet Row Most Recent Value   Initial Alcohol Screen: US AUDIT-C     1. How often do you have a drink containing alcohol? 0 Filed at: 07/19/2024 0937   2. How many drinks containing alcohol do you have on a typical day you are drinking?  0 Filed at: 07/19/2024 0937   3a. Male UNDER 65: How often do you have five or more drinks on one occasion? 0 Filed at: 07/19/2024 0937   3b. FEMALE Any Age, or MALE 65+: How often do you have 4 or more drinks on one occassion? 0 Filed at: 07/19/2024 0937   Audit-C  "Score 0 Filed at: 07/19/2024 0937   JERZY: How many times in the past year have you...    Used an illegal drug or used a prescription medication for non-medical reasons? Never Filed at: 07/19/2024 0937                      Medical Decision Making  Ambulating steady gait, answering questions appropriately. Clinically sober. Last methadone dose given and dose confirmed by RN with methadone clinic [facility]. Last dose yesterday. One time dose given today patient informed he must follow up with his methadone clinic.    All imaging and/or lab testing discussed with patient, strict return to ED precautions discussed. Patient recommended to follow up promptly with appropriate outpatient provider and risk of morbidity/mortality if patient does not follow up as recommended was discussed. Patient and/or family members verbalizes understanding and agrees with plan. Patient and/or family members were given opportunity to ask questions, all questions were answered at this time. Patient is stable for discharge.     Portions of the record may have been created with voice recognition software. Occasional wrong word or \"sound a like\" substitutions may have occurred due to the inherent limitations of voice recognition software. Read the chart carefully and recognize, using context, where substitutions have occurred.       Risk  Prescription drug management.                 Disposition  Final diagnoses:   Methadone dependence (HCC)   Encounter for medication refill     Time reflects when diagnosis was documented in both MDM as applicable and the Disposition within this note       Time User Action Codes Description Comment    7/19/2024 10:01 AM Divya Kulkarni [F11.20] Methadone dependence (HCC)     7/19/2024 10:01 AM Divya Kulkarni [Z76.0] Encounter for medication refill           ED Disposition       ED Disposition   Discharge    Condition   Stable    Date/Time   Fri Jul 19, 2024 1001    Comment   Deniz Tate " discharge to home/self care.                   Follow-up Information       Follow up With Specialties Details Why Contact Info    [facility]                Patient's Medications   Discharge Prescriptions    No medications on file       No discharge procedures on file.    PDMP Review         Value Time User    PDMP Reviewed  Yes 8/30/2023  7:32 PM Cassandra Hernandez PA-C            ED Provider  Electronically Signed by             Divya Kulkarni PA-C  07/19/24 1012

## 2024-07-19 NOTE — ED NOTES
Methadone verified by Luba at [facility]  Per Luba:  Last dose 7/18/24 @0926  120 mg     Candice Funk RN  07/19/24 0937

## 2024-07-20 ENCOUNTER — HOSPITAL ENCOUNTER (EMERGENCY)
Facility: HOSPITAL | Age: 29
Discharge: HOME | End: 2024-07-20
Attending: EMERGENCY MEDICINE | Admitting: EMERGENCY MEDICINE
Payer: COMMERCIAL

## 2024-07-20 VITALS
TEMPERATURE: 98.3 F | SYSTOLIC BLOOD PRESSURE: 137 MMHG | OXYGEN SATURATION: 95 % | DIASTOLIC BLOOD PRESSURE: 110 MMHG | HEART RATE: 104 BPM | RESPIRATION RATE: 16 BRPM

## 2024-07-20 DIAGNOSIS — Z76.0 MEDICATION REFILL: Primary | ICD-10-CM

## 2024-07-20 PROCEDURE — 99281 EMR DPT VST MAYX REQ PHY/QHP: CPT

## 2024-07-20 PROCEDURE — 99283 EMERGENCY DEPT VISIT LOW MDM: CPT | Performed by: EMERGENCY MEDICINE

## 2024-07-20 RX ORDER — METHADONE HYDROCHLORIDE 10 MG/ML
120 CONCENTRATE ORAL ONCE
Status: COMPLETED | OUTPATIENT
Start: 2024-07-20 | End: 2024-07-20

## 2024-07-20 RX ADMIN — METHADONE HYDROCHLORIDE 120 MG: 10 CONCENTRATE ORAL at 13:35

## 2024-07-20 NOTE — ED NOTES
Methadone 120 mg confirmed with Vicky from  [facility]  Last dose at the clinic was 7/18/24     Candice Funk, RN  07/20/24 7782

## 2024-07-21 NOTE — ED PROVIDER NOTES
History  Chief Complaint   Patient presents with    Medication Refill     Requesting Methadone dose - seen yesterday for same      29-year-old male seeking methadone.  Having car troubles unable to get to his clinic.      Medication Refill      Prior to Admission Medications   Prescriptions Last Dose Informant Patient Reported? Taking?   acetaminophen (TYLENOL) 500 mg tablet   No No   Sig: Take 1 tablet (500 mg total) by mouth every 6 (six) hours as needed for moderate pain   bacitracin topical ointment 500 units/g topical ointment   No No   Sig: Apply 1 large application topically 2 (two) times a day      Facility-Administered Medications: None       Past Medical History:   Diagnosis Date    Migraines     Psychiatric disorder        Past Surgical History:   Procedure Laterality Date    NO PAST SURGERIES         History reviewed. No pertinent family history.  I have reviewed and agree with the history as documented.    E-Cigarette/Vaping     E-Cigarette/Vaping Substances     Social History     Tobacco Use    Smoking status: Every Day     Current packs/day: 0.50     Types: Cigarettes     Passive exposure: Yes   Substance Use Topics    Alcohol use: No    Drug use: Yes     Types: Fentanyl, Heroin, Benzodiazepines, Other       Review of Systems   Constitutional: Negative.  Negative for chills and fever.   HENT: Negative.  Negative for rhinorrhea, sore throat, trouble swallowing and voice change.    Eyes: Negative.  Negative for pain and visual disturbance.   Respiratory: Negative.  Negative for cough, shortness of breath and wheezing.    Cardiovascular: Negative.  Negative for chest pain and palpitations.   Gastrointestinal:  Negative for abdominal pain, diarrhea, nausea and vomiting.   Genitourinary: Negative.  Negative for dysuria and frequency.   Musculoskeletal: Negative.  Negative for neck pain and neck stiffness.   Skin: Negative.  Negative for rash.   Neurological: Negative.  Negative for dizziness, speech  difficulty, weakness, light-headedness and numbness.       Physical Exam  Physical Exam  Vitals and nursing note reviewed.   Constitutional:       General: He is not in acute distress.     Appearance: He is well-developed.   HENT:      Head: Normocephalic and atraumatic.   Eyes:      Conjunctiva/sclera: Conjunctivae normal.      Pupils: Pupils are equal, round, and reactive to light.   Neck:      Trachea: No tracheal deviation.   Cardiovascular:      Rate and Rhythm: Normal rate and regular rhythm.   Pulmonary:      Effort: Pulmonary effort is normal. No respiratory distress.      Breath sounds: Normal breath sounds. No wheezing or rales.   Abdominal:      General: Bowel sounds are normal. There is no distension.      Palpations: Abdomen is soft.      Tenderness: There is no abdominal tenderness. There is no guarding or rebound.   Musculoskeletal:         General: No tenderness or deformity. Normal range of motion.      Cervical back: Normal range of motion and neck supple.   Skin:     General: Skin is warm and dry.      Capillary Refill: Capillary refill takes less than 2 seconds.      Findings: No rash.   Neurological:      Mental Status: He is alert and oriented to person, place, and time.   Psychiatric:         Behavior: Behavior normal.         Vital Signs  ED Triage Vitals   Temperature Pulse Respirations Blood Pressure SpO2   07/20/24 1300 07/20/24 1300 07/20/24 1300 07/20/24 1300 07/20/24 1300   98.3 °F (36.8 °C) 104 16 (!) 137/110 95 %      Temp Source Heart Rate Source Patient Position - Orthostatic VS BP Location FiO2 (%)   07/20/24 1300 07/20/24 1300 07/20/24 1300 07/20/24 1300 --   Tympanic Monitor Sitting Right arm       Pain Score       07/20/24 1335       Med Not Given for Pain - for MAR use only           Vitals:    07/20/24 1300   BP: (!) 137/110   Pulse: 104   Patient Position - Orthostatic VS: Sitting         Visual Acuity      ED Medications  Medications   methadone (DOLOPHINE) oral concentrated  solution 120 mg (120 mg Oral Given 7/20/24 1335)       Diagnostic Studies  Results Reviewed       None                   No orders to display              Procedures  Procedures         ED Course                                               Medical Decision Making  Clinic call to confirm dosing, given methadone.  Patient states he will need 1 more day to get his car back and then will no longer be using the ER for his methadone.    Risk  Prescription drug management.                 Disposition  Final diagnoses:   Medication refill     Time reflects when diagnosis was documented in both MDM as applicable and the Disposition within this note       Time User Action Codes Description Comment    7/20/2024  1:38 PM Mati Conley Add [Z76.0] Medication refill           ED Disposition       ED Disposition   Discharge    Condition   Stable    Date/Time   Sat Jul 20, 2024  1:38 PM    Comment   Deniz Tate discharge to home/self care.                   Follow-up Information       Follow up With Specialties Details Why Contact Info Additional Information    [facility] Medicine In 1 week  [address] [place] [id number] [phone] [facility], [address] [place] [address], [id number] [phone]            Discharge Medication List as of 7/20/2024  1:45 PM        CONTINUE these medications which have NOT CHANGED    Details   acetaminophen (TYLENOL) 500 mg tablet Take 1 tablet (500 mg total) by mouth every 6 (six) hours as needed for moderate pain, Starting Thu 8/3/2023, Normal      bacitracin topical ointment 500 units/g topical ointment Apply 1 large application topically 2 (two) times a day, Starting Wed 8/30/2023, Normal             No discharge procedures on file.    PDMP Review         Value Time User    PDMP Reviewed  Yes 8/30/2023  7:32 PM Cassandra Hernandez PA-C            ED  Provider  Electronically Signed by             Mati Conley DO  07/21/24 0713

## 2024-08-30 ENCOUNTER — HOSPITAL ENCOUNTER (EMERGENCY)
Facility: HOSPITAL | Age: 29
Discharge: HOME | End: 2024-08-30
Attending: EMERGENCY MEDICINE
Payer: COMMERCIAL

## 2024-08-30 VITALS
TEMPERATURE: 98.8 F | SYSTOLIC BLOOD PRESSURE: 144 MMHG | OXYGEN SATURATION: 99 % | HEART RATE: 73 BPM | WEIGHT: 213.85 LBS | RESPIRATION RATE: 20 BRPM | DIASTOLIC BLOOD PRESSURE: 87 MMHG

## 2024-08-30 DIAGNOSIS — F11.20 METHADONE MAINTENANCE THERAPY PATIENT (HCC): ICD-10-CM

## 2024-08-30 DIAGNOSIS — F19.10 POLYSUBSTANCE ABUSE (HCC): Primary | ICD-10-CM

## 2024-08-30 DIAGNOSIS — F11.93 OPIOID WITHDRAWAL (HCC): ICD-10-CM

## 2024-08-30 LAB
AMPHETAMINES SERPL QL SCN: POSITIVE
BARBITURATES UR QL: NEGATIVE
BENZODIAZ UR QL: POSITIVE
COCAINE UR QL: POSITIVE
ETHANOL EXG-MCNC: 0 MG/DL
FENTANYL UR QL SCN: POSITIVE
HYDROCODONE UR QL SCN: NEGATIVE
METHADONE UR QL: POSITIVE
OPIATES UR QL SCN: POSITIVE
OXYCODONE+OXYMORPHONE UR QL SCN: NEGATIVE
PCP UR QL: NEGATIVE
THC UR QL: POSITIVE

## 2024-08-30 PROCEDURE — 80307 DRUG TEST PRSMV CHEM ANLYZR: CPT

## 2024-08-30 PROCEDURE — 99282 EMERGENCY DEPT VISIT SF MDM: CPT

## 2024-08-30 PROCEDURE — 82075 ASSAY OF BREATH ETHANOL: CPT

## 2024-08-30 PROCEDURE — 99285 EMERGENCY DEPT VISIT HI MDM: CPT

## 2024-08-30 PROCEDURE — NC001 PR NO CHARGE: Performed by: EMERGENCY MEDICINE

## 2024-08-30 RX ORDER — ONDANSETRON 4 MG/1
4 TABLET, ORALLY DISINTEGRATING ORAL ONCE
Status: DISCONTINUED | OUTPATIENT
Start: 2024-08-30 | End: 2024-08-30 | Stop reason: HOSPADM

## 2024-08-30 RX ORDER — LORAZEPAM 1 MG/1
1 TABLET ORAL ONCE
Status: COMPLETED | OUTPATIENT
Start: 2024-08-30 | End: 2024-08-30

## 2024-08-30 RX ORDER — METHADONE HYDROCHLORIDE 10 MG/ML
125 CONCENTRATE ORAL EVERY MORNING
Status: DISCONTINUED | OUTPATIENT
Start: 2024-08-30 | End: 2024-08-30 | Stop reason: HOSPADM

## 2024-08-30 RX ORDER — LOPERAMIDE HCL 2 MG
2 CAPSULE ORAL ONCE
Status: DISCONTINUED | OUTPATIENT
Start: 2024-08-30 | End: 2024-08-30 | Stop reason: HOSPADM

## 2024-08-30 RX ORDER — METHADONE HYDROCHLORIDE 10 MG/1
125 TABLET ORAL DAILY
COMMUNITY

## 2024-08-30 RX ORDER — ONDANSETRON 4 MG/1
4 TABLET, ORALLY DISINTEGRATING ORAL ONCE
Status: COMPLETED | OUTPATIENT
Start: 2024-08-30 | End: 2024-08-30

## 2024-08-30 RX ORDER — CYCLOBENZAPRINE HCL 10 MG
10 TABLET ORAL ONCE
Status: COMPLETED | OUTPATIENT
Start: 2024-08-30 | End: 2024-08-30

## 2024-08-30 RX ADMIN — ONDANSETRON 4 MG: 4 TABLET, ORALLY DISINTEGRATING ORAL at 05:36

## 2024-08-30 RX ADMIN — LORAZEPAM 1 MG: 1 TABLET ORAL at 05:36

## 2024-08-30 RX ADMIN — METHADONE HYDROCHLORIDE 125 MG: 10 CONCENTRATE ORAL at 07:36

## 2024-08-30 RX ADMIN — CYCLOBENZAPRINE HYDROCHLORIDE 10 MG: 10 TABLET, FILM COATED ORAL at 19:01

## 2024-08-30 NOTE — ED PROVIDER NOTES
"History  Chief Complaint   Patient presents with    Detox Evaluation     Reports using 8 bags of fentanyl IV daily along with taking his usual methadone dose of 125mg. Also uses 5mg of benzos weekly. States it's hard to find fentanyl without trank and yesterday he thinks he also had trank and received only half of his dose of methadone. Wants to essentially get off all drugs . Is interested in sublacade. Last use was yesterday at noon.     The patient is a 29-year-old male with a past medical history of substance use on Methadone maintenance therapy, migraines, and a psychiatric disorder, who presents for detox evaluation.  He reports 8 bags of IV fentanyl daily, ~5-8mg of xanax weekly, and occasional cocaine use for the last several months, in addition to his daily methadone dose.  Per patient he used fentanyl and tranq two days ago and when he went to his methadone clinic yesterday, he was \"too lethargic\" to receive his normal dose, so he was given half a dose and he subsequently used fentanyl.  Last use of fentanyl was around noon yesterday.  Patient woke up 2 hours PTA with chills, nausea, and restlessness/agitation.  No chest pain, shortness of breath, lightheadedness, vomiting, diarrhea, URI symptoms, or fever.  Patient wishes to go to a 30 day rehab program and be transitioned from methadone to Sublocade injections.        Prior to Admission Medications   Prescriptions Last Dose Informant Patient Reported? Taking?   acetaminophen (TYLENOL) 500 mg tablet   No No   Sig: Take 1 tablet (500 mg total) by mouth every 6 (six) hours as needed for moderate pain   bacitracin topical ointment 500 units/g topical ointment Not Taking  No No   Sig: Apply 1 large application topically 2 (two) times a day   Patient not taking: Reported on 8/30/2024   methadone (DOLOPHINE) 10 mg tablet  Self Yes Yes   Sig: Take 125 mg by mouth daily,      Facility-Administered Medications: None       Past Medical History:   Diagnosis Date    " Migraines     Psychiatric disorder     Substance abuse (HCC)        Past Surgical History:   Procedure Laterality Date    NO PAST SURGERIES         History reviewed. No pertinent family history.  I have reviewed and agree with the history as documented.    E-Cigarette/Vaping    E-Cigarette Use Never User      E-Cigarette/Vaping Substances     Social History     Tobacco Use    Smoking status: Every Day     Current packs/day: 0.25     Types: Cigarettes     Passive exposure: Yes   Vaping Use    Vaping status: Never Used   Substance Use Topics    Alcohol use: Yes     Comment: socially    Drug use: Yes     Types: Fentanyl, Heroin, Benzodiazepines, Other     Comment: methadone       Review of Systems   Constitutional:  Positive for chills. Negative for diaphoresis and fever.   HENT:  Negative for congestion, ear pain, rhinorrhea and sore throat.    Eyes:  Negative for pain and visual disturbance.   Respiratory:  Negative for cough and shortness of breath.    Cardiovascular:  Negative for chest pain and palpitations.   Gastrointestinal:  Positive for nausea. Negative for abdominal distention, abdominal pain, diarrhea and vomiting.   Genitourinary:  Negative for dysuria and hematuria.   Musculoskeletal:  Negative for arthralgias, back pain and myalgias.   Skin:  Negative for color change and rash.   Neurological:  Negative for dizziness, seizures, syncope, weakness and headaches.   Psychiatric/Behavioral:  Positive for agitation.    All other systems reviewed and are negative.      Physical Exam  Physical Exam  Vitals and nursing note reviewed.   Constitutional:       General: He is awake. He is not in acute distress.     Appearance: Normal appearance. He is well-developed and overweight. He is not toxic-appearing or diaphoretic.   HENT:      Head: Normocephalic and atraumatic.      Right Ear: External ear normal.      Left Ear: External ear normal.      Nose: Nose normal. No congestion or rhinorrhea.      Mouth/Throat:       Lips: Pink.      Mouth: Mucous membranes are moist.      Pharynx: Oropharynx is clear. Uvula midline.   Eyes:      General: Lids are normal. Vision grossly intact. Gaze aligned appropriately.      Conjunctiva/sclera: Conjunctivae normal.      Pupils: Pupils are equal, round, and reactive to light.   Cardiovascular:      Rate and Rhythm: Normal rate and regular rhythm.      Heart sounds: S1 normal and S2 normal. No murmur heard.     No friction rub. No gallop.   Pulmonary:      Effort: Pulmonary effort is normal. No respiratory distress.      Breath sounds: Normal breath sounds and air entry. No wheezing, rhonchi or rales.   Abdominal:      General: Abdomen is flat.      Palpations: Abdomen is soft.      Tenderness: There is no abdominal tenderness. There is no guarding or rebound.   Musculoskeletal:      Cervical back: Normal, full passive range of motion without pain and neck supple. No rigidity or crepitus. No spinous process tenderness or muscular tenderness.      Thoracic back: Normal. No spasms, tenderness or bony tenderness.      Lumbar back: Normal. No spasms, tenderness or bony tenderness.   Skin:     General: Skin is warm and dry.      Capillary Refill: Capillary refill takes less than 2 seconds.      Coloration: Skin is not pale.      Findings: No rash.   Neurological:      Mental Status: He is alert and oriented to person, place, and time.   Psychiatric:         Attention and Perception: Perception normal. He is inattentive.         Mood and Affect: Mood and affect normal.         Speech: Speech normal.         Behavior: Behavior is cooperative.         Vital Signs  ED Triage Vitals [08/30/24 0504]   Temperature Pulse Respirations Blood Pressure SpO2   98.8 °F (37.1 °C) 79 18 132/71 95 %      Temp Source Heart Rate Source Patient Position - Orthostatic VS BP Location FiO2 (%)   Tympanic Monitor -- Left arm --      Pain Score       --           Vitals:    08/30/24 0504   BP: 132/71   Pulse: 79          Visual Acuity      ED Medications  Medications   methadone (DOLOPHINE) oral concentrated solution 125 mg (has no administration in time range)   ondansetron (ZOFRAN-ODT) dispersible tablet 4 mg (4 mg Oral Given 8/30/24 0536)   LORazepam (ATIVAN) tablet 1 mg (1 mg Oral Given 8/30/24 0536)       Diagnostic Studies  Results Reviewed       Procedure Component Value Units Date/Time    Rapid drug screen, urine [[id number]]  (Abnormal) Collected: 08/30/24 0606    Lab Status: Final result Specimen: Urine, Other Updated: 08/30/24 0704     Amph/Meth UR Positive     Barbiturate Ur Negative     Benzodiazepine Urine Positive     Cocaine Urine Positive     Methadone Urine Positive     Opiate Urine Positive     PCP Ur Negative     THC Urine Positive     Oxycodone Urine Negative     Fentanyl Urine Positive     HYDROCODONE URINE Negative    Narrative:      Presumptive report. If requested, specimen will be sent to reference lab for confirmation.  FOR MEDICAL PURPOSES ONLY.   IF CONFIRMATION NEEDED PLEASE CONTACT THE LAB WITHIN 5 DAYS.    Drug Screen Cutoff Levels:  AMPHETAMINE/METHAMPHETAMINES  1000 ng/mL  BARBITURATES     200 ng/mL  BENZODIAZEPINES     200 ng/mL  COCAINE      300 ng/mL  METHADONE      300 ng/mL  OPIATES      300 ng/mL  PHENCYCLIDINE     25 ng/mL  THC       50 ng/mL  OXYCODONE      100 ng/mL  FENTANYL      5 ng/mL  HYDROCODONE     300 ng/mL    POCT alcohol breath test [[id number]]  (Normal) Resulted: 08/30/24 0528    Lab Status: Final result Updated: 08/30/24 0528     EXTBreath Alcohol 0.00                   No orders to display              Procedures  Procedures         ED Course  ED Course as of 08/30/24 0727   Fri Aug 30, 2024   0705 AMPH/METH(!): Positive   0705 BENZODIAZEPINE URINE(!): Positive   0705 COCAINE URINE(!): Positive   0705 METHADONE URINE(!): Positive   0705 OPIATE URINE(!): Positive   0705 THC URINE(!): Positive   0705 FENTANYL URINE(!): Positive   0710 Confirmed with ELIAS Doan from  "[facility] methadone clinic that patient typically receives 125mg daily between 9a-10a.  Yesterday he was given 62mg at 11:27a as the staff did not feel it was appropriate to give his normal dose as he was already impaired.           SBIRT (Z71.41, Z71.51):  Screening: I have reviewed and agree with the nursing documentation below.  Brief Intervention: gave feedback about screening results, impairment, and risks while clarifying the findings, informed the patient about safe consumption limits and offered advice about change, and assessed the patient's readiness to change  Referral to Treatment: In Process  Time spent with patient for SBIRT: 15 minutes      SBIRT 20yo+      Flowsheet Row Most Recent Value   Initial Alcohol Screen: US AUDIT-C     1. How often do you have a drink containing alcohol? 1 Filed at: 08/30/2024 0510   2. How many drinks containing alcohol do you have on a typical day you are drinking?  1 Filed at: 08/30/2024 0510   3a. Male UNDER 65: How often do you have five or more drinks on one occasion? 0 Filed at: 08/30/2024 0510   3b. FEMALE Any Age, or MALE 65+: How often do you have 4 or more drinks on one occassion? 0 Filed at: 08/30/2024 0510   Audit-C Score 2 Filed at: 08/30/2024 0510   JERZY: How many times in the past year have you...    Used an illegal drug or used a prescription medication for non-medical reasons? Daily or Almost Daily Filed at: 08/30/2024 0510   DAST-10: In the past 12 months...    1. Have you used drugs other than those required for medical reasons? 1 Filed at: 08/30/2024 0510   2. Do you use more than one drug at a time? 1 Filed at: 08/30/2024 0510   3. Have you had medical problems as a result of your drug use (e.g., memory loss, hepatitis, convulsions, bleeding, etc.)? 0 Filed at: 08/30/2024 0510   4. Have you had \"blackouts\" or \"flashbacks\" as a result of drug use?YesNo 0 Filed at: 08/30/2024 0510   5. Do you ever feel bad or guilty about your " drug use? 1 Filed at: 08/30/2024 0510   6. Does your spouse (or parent) ever complain about your involvement with drugs? 1 Filed at: 08/30/2024 0510   7. Have you neglected your family because of your use of drugs? 1 Filed at: 08/30/2024 0510   8. Have you engaged in illegal activities in order to obtain drugs? 0 Filed at: 08/30/2024 0510   9. Have you ever experienced withdrawal symptoms (felt sick) when you stopped taking drugs? 1 Filed at: 08/30/2024 0510   10. Are you always able to stop using drugs when you want to? 1 Filed at: 08/30/2024 0510   DAST-10 Score 7 Filed at: 08/30/2024 0510              Medical Decision Making  Patient presents requesting detox from fentanyl, methadone, cocaine, and xanax.  Differential diagnosis includes but is not limited to opioid use disorder, polysubstance use disorder, or acute opioid withdrawal.  Vitals are stable and patient reports mild withdrawal symptoms.  Referrals for HOST and the certified  were placed.  Patient signed out to Dr. Pierre for final disposition pending HOST consult and placement.      Amount and/or Complexity of Data Reviewed  Labs: ordered.    Risk  Prescription drug management.           Disposition  Final diagnoses:   Methadone maintenance therapy patient (HCC)   Polysubstance abuse (HCC)   Opioid withdrawal (HCC)     Time reflects when diagnosis was documented in both MDM as applicable and the Disposition within this note       Time User Action Codes Description Comment    8/30/2024  5:15 AM Patti Zaragoza Add [F11.90] Opioid use disorder     8/30/2024  5:15 AM Patti Zaragoza Add [F11.20] Methadone maintenance therapy patient (HCC)     8/30/2024  5:53 AM Patti Zaragoza Add [F19.10] Polysubstance abuse (HCC)     8/30/2024  5:53 AM Patti Zaragoza Modify [F11.90] Opioid use disorder     8/30/2024  5:53 AM Patti Zaragoza Modify [F19.10] Polysubstance abuse (HCC)     8/30/2024  5:53 AM Patti Zaragoza Remove [F11.90] Opioid use disorder      8/30/2024  5:53 AM Patti Zaragoza Add [F11.93] Opioid withdrawal (HCC)           ED Disposition       None          Follow-up Information    None         Patient's Medications   Discharge Prescriptions    No medications on file       No discharge procedures on file.    PDMP Review         Value Time User    PDMP Reviewed  Yes 8/30/2024  5:40 AM Patti aZragoza PA-C            ED Provider  Electronically Signed by             Patti Zaragoza PA-C  08/30/24 0725

## 2024-08-30 NOTE — ED PROVIDER NOTES
History  Chief Complaint   Patient presents with   • Detox Evaluation     Reports using 8 bags of fentanyl IV daily along with taking his usual methadone dose of 125mg. Also uses 5mg of benzos weekly. States it's hard to find fentanyl without trank and yesterday he thinks he also had trank and received only half of his dose of methadone. Wants to essentially get off all drugs . Is interested in sublacade. Last use was yesterday at noon.     HPI    Prior to Admission Medications   Prescriptions Last Dose Informant Patient Reported? Taking?   acetaminophen (TYLENOL) 500 mg tablet   No No   Sig: Take 1 tablet (500 mg total) by mouth every 6 (six) hours as needed for moderate pain   bacitracin topical ointment 500 units/g topical ointment Not Taking  No No   Sig: Apply 1 large application topically 2 (two) times a day   Patient not taking: Reported on 8/30/2024   methadone (DOLOPHINE) 10 mg tablet  Self Yes Yes   Sig: Take 125 mg by mouth daily,      Facility-Administered Medications: None       Past Medical History:   Diagnosis Date   • Migraines    • Psychiatric disorder    • Substance abuse (HCC)        Past Surgical History:   Procedure Laterality Date   • NO PAST SURGERIES         History reviewed. No pertinent family history.  I have reviewed and agree with the history as documented.    E-Cigarette/Vaping   • E-Cigarette Use Never User      E-Cigarette/Vaping Substances     Social History     Tobacco Use   • Smoking status: Every Day     Current packs/day: 0.25     Types: Cigarettes     Passive exposure: Yes   Vaping Use   • Vaping status: Never Used   Substance Use Topics   • Alcohol use: Yes     Comment: socially   • Drug use: Yes     Types: Fentanyl, Heroin, Benzodiazepines, Other     Comment: methadone       Review of Systems    Physical Exam  Physical Exam    Vital Signs  ED Triage Vitals   Temperature Pulse Respirations Blood Pressure SpO2   08/30/24 0504 08/30/24 0504 08/30/24 0504 08/30/24 0504 08/30/24  0504   98.8 °F (37.1 °C) 79 18 132/71 95 %      Temp Source Heart Rate Source Patient Position - Orthostatic VS BP Location FiO2 (%)   08/30/24 0504 08/30/24 0504 08/30/24 0738 08/30/24 0504 --   Tympanic Monitor Sitting Left arm       Pain Score       08/30/24 0736       Med Not Given for Pain - for MAR use only           Vitals:    08/30/24 0504 08/30/24 0738 08/30/24 1220   BP: 132/71 120/83 144/87   Pulse: 79 69 73   Patient Position - Orthostatic VS:  Sitting Sitting         Visual Acuity  Visual Acuity      Flowsheet Row Most Recent Value   L Pupil Size (mm) 3   R Pupil Size (mm) 3            ED Medications  Medications   methadone (DOLOPHINE) oral concentrated solution 125 mg (125 mg Oral Given 8/30/24 0736)   ondansetron (ZOFRAN-ODT) dispersible tablet 4 mg (has no administration in time range)   loperamide (IMODIUM) capsule 2 mg (has no administration in time range)   ondansetron (ZOFRAN-ODT) dispersible tablet 4 mg (4 mg Oral Given 8/30/24 0536)   LORazepam (ATIVAN) tablet 1 mg (1 mg Oral Given 8/30/24 0536)   cyclobenzaprine (FLEXERIL) tablet 10 mg (10 mg Oral Given 8/30/24 1901)       Diagnostic Studies  Results Reviewed       Procedure Component Value Units Date/Time    Rapid drug screen, urine [[id number]]  (Abnormal) Collected: 08/30/24 0606    Lab Status: Final result Specimen: Urine, Other Updated: 08/30/24 0704     Amph/Meth UR Positive     Barbiturate Ur Negative     Benzodiazepine Urine Positive     Cocaine Urine Positive     Methadone Urine Positive     Opiate Urine Positive     PCP Ur Negative     THC Urine Positive     Oxycodone Urine Negative     Fentanyl Urine Positive     HYDROCODONE URINE Negative    Narrative:      Presumptive report. If requested, specimen will be sent to reference lab for confirmation.  FOR MEDICAL PURPOSES ONLY.   IF CONFIRMATION NEEDED PLEASE CONTACT THE LAB WITHIN 5 DAYS.    Drug Screen Cutoff Levels:  AMPHETAMINE/METHAMPHETAMINES  1000 ng/mL  BARBITURATES     200  ng/mL  BENZODIAZEPINES     200 ng/mL  COCAINE      300 ng/mL  METHADONE      300 ng/mL  OPIATES      300 ng/mL  PHENCYCLIDINE     25 ng/mL  THC       50 ng/mL  OXYCODONE      100 ng/mL  FENTANYL      5 ng/mL  HYDROCODONE     300 ng/mL    POCT alcohol breath test [[id number]]  (Normal) Resulted: 08/30/24 0528    Lab Status: Final result Updated: 08/30/24 0528     EXTBreath Alcohol 0.00                   No orders to display              Procedures  Procedures         ED Course  ED Course as of 08/30/24 1930   Fri Aug 30, 2024   1811 Asking for comfort meds.  Awaiting info from HOST.   1836 Patient came out of room to ask me to check his BP to see if he can get some clonidine.  I had previously asked him to stay in his room and not stand at the doorway.  States he called his friend to is an ER doctor with [facility] and he stated that they encourage patients to walk around and him doing so would not be a violation of HIPAA.  Politely disagreed.    1929 Accepted at [facility].                                 SBIRT 20yo+      Flowsheet Row Most Recent Value   Initial Alcohol Screen: US AUDIT-C     1. How often do you have a drink containing alcohol? 1 Filed at: 08/30/2024 0510   2. How many drinks containing alcohol do you have on a typical day you are drinking?  1 Filed at: 08/30/2024 0510   3a. Male UNDER 65: How often do you have five or more drinks on one occasion? 0 Filed at: 08/30/2024 0510   3b. FEMALE Any Age, or MALE 65+: How often do you have 4 or more drinks on one occassion? 0 Filed at: 08/30/2024 0510   Audit-C Score 2 Filed at: 08/30/2024 0510   JERZY: How many times in the past year have you...    Used an illegal drug or used a prescription medication for non-medical reasons? Daily or Almost Daily Filed at: 08/30/2024 0510   DAST-10: In the past 12 months...    1. Have you used drugs other than those required for medical reasons? 1 Filed at: 08/30/2024 0510   2. Do you use more than one drug at a time? 1 Filed  "at: 08/30/2024 0510   3. Have you had medical problems as a result of your drug use (e.g., memory loss, hepatitis, convulsions, bleeding, etc.)? 0 Filed at: 08/30/2024 0510   4. Have you had \"blackouts\" or \"flashbacks\" as a result of drug use?YesNo 0 Filed at: 08/30/2024 0510   5. Do you ever feel bad or guilty about your drug use? 1 Filed at: 08/30/2024 0510   6. Does your spouse (or parent) ever complain about your involvement with drugs? 1 Filed at: 08/30/2024 0510   7. Have you neglected your family because of your use of drugs? 1 Filed at: 08/30/2024 0510   8. Have you engaged in illegal activities in order to obtain drugs? 0 Filed at: 08/30/2024 0510   9. Have you ever experienced withdrawal symptoms (felt sick) when you stopped taking drugs? 1 Filed at: 08/30/2024 0510   10. Are you always able to stop using drugs when you want to? 1 Filed at: 08/30/2024 0510   DAST-10 Score 7 Filed at: 08/30/2024 0510                      Medical Decision Making  Presented with [facility] for rehab.  Placement found.     Amount and/or Complexity of Data Reviewed  External Data Reviewed: notes.  Labs: ordered. Decision-making details documented in ED Course.    Risk  Prescription drug management.               Disposition  Final diagnoses:   Methadone maintenance therapy patient (HCC)   Polysubstance abuse (HCC)   Opioid withdrawal (HCC)     Time reflects when diagnosis was documented in both MDM as applicable and the Disposition within this note       Time User Action Codes Description Comment    8/30/2024  5:15 AM Patti Zaragoza Add [F11.90] Opioid use disorder     8/30/2024  5:15 AM Patti Zaragoza Add [F11.20] Methadone maintenance therapy patient (HCC)     8/30/2024  5:53 AM Patti Zaragoza Add [F19.10] Polysubstance abuse (HCC)     8/30/2024  5:53 AM Patti Zaragoza Modify [F11.90] Opioid use disorder     8/30/2024  5:53 AM Patti Zaragoza Modify [F19.10] Polysubstance abuse (HCC)     8/30/2024  5:53 AM Patti Zaragoza Remove " [F11.90] Opioid use disorder     8/30/2024  5:53 AM Patti Zaragoza Add [F11.93] Opioid withdrawal (HCC)           ED Disposition       ED Disposition   Discharge    Condition   Stable    Date/Time   Fri Aug 30, 2024 1929    Comment   Deniz Tate discharge to home/self care.                   Follow-up Information       Follow up With Specialties Details Why Contact Info        Follow up immedately at [place]            Patient's Medications   Discharge Prescriptions    No medications on file       No discharge procedures on file.    PDMP Review         Value Time User    PDMP Reviewed  Yes 8/30/2024  5:40 AM Patti Zaragoza PA-C            ED Provider  Electronically Signed by             Carlos Eduardo Flynn MD  08/30/24 1930

## 2024-08-30 NOTE — ED CARE HANDOFF
[facility] Warm Handoff Outcome Note    Patient name Deniz Tate  Location FT 02/FT 02 MRN [id number]  Age: 29 y.o.          Plan Type:  Warm Handoff                                                                                    Plan Date: 8/30/2024  Service:  ED Warm Handoff      Substance Use History:  Polysubstance    Warm Handoff Update:  Pt accepted at [facility] for IP treatment    Warm Handoff Outcome: Residential  Inpatient

## 2024-08-30 NOTE — ED NOTES
[facility], pick time scheduled for 1930.      Lakia Shepherd, RN  08/30/24 1910

## 2024-08-30 NOTE — DISCHARGE INSTR - OTHER ORDERS
Marcela Maciel  Certified     [facility]  [place], [place] and [facility]  [phone]    [facility]  [address], 1st [address]  [place]  [address]  [phone]    [facility] [address]  [address] [place]  [address]  [phone]    [facility]   [email or web address]  [phone]    [facility]  [address]  [place]  [address]  [phone]    [facility]  [address] [place]  [address]  [place]: [phone]  Group meets on Thursday evenings from 7pm to 830pm

## 2024-08-30 NOTE — CERTIFIED RECOVERY SPECIALIST
Certified  Note    Patient name: Deniz Tate  Location:   [place]: [facility]  Attending:  Jeremi Pierre MD MRN [id number]  : 1995  Age: 29 y.o.    Sex: male Date 2024         Substance Use History:     Social History     Substance and Sexual Activity   Alcohol Use Yes    Comment: socially        Social History     Substance and Sexual Activity   Drug Use Yes    Types: Fentanyl, Heroin, Benzodiazepines, Other    Comment: methadone     Time Spent: 10 minutes    CRS introduced services to patient who engaged.(Patient's  in room as well as patient's mom) Patient stated he has been trying to get into treatment but wants help to Detox off Methadone and go on Sublocaid injection. [facility] is working on placement. Patient is willing to go where they can help him. CRS and patient discuss plan for recovery.   CRS provided business card and resources.                '          Marcela Maciel

## 2024-08-30 NOTE — ED CARE HANDOFF
Emergency Department Sign Out Note        Sign out and transfer of care from Dr. Pierre. See Separate Emergency Department note.     The patient, Deniz Tate, was evaluated by the previous provider for CARMELITA.    Workup Completed:  Medically cleared    ED Course / Workup Pending (followup):  Pending HOST                                     Procedures  Medical Decision Making  Amount and/or Complexity of Data Reviewed  Labs: ordered.    Risk  Prescription drug management.            Disposition  Final diagnoses:   Methadone maintenance therapy patient (HCC)   Polysubstance abuse (HCC)   Opioid withdrawal (HCC)     Time reflects when diagnosis was documented in both MDM as applicable and the Disposition within this note       Time User Action Codes Description Comment    8/30/2024  5:15 AM Patti Zaragoza Add [F11.90] Opioid use disorder     8/30/2024  5:15 AM Patti Zaragoza Add [F11.20] Methadone maintenance therapy patient (HCC)     8/30/2024  5:53 AM Patti Zaragoza Add [F19.10] Polysubstance abuse (HCC)     8/30/2024  5:53 AM Patti Zaragoza Modify [F11.90] Opioid use disorder     8/30/2024  5:53 AM Patti Zaragoza Modify [F19.10] Polysubstance abuse (HCC)     8/30/2024  5:53 AM Patti Zaragoza Remove [F11.90] Opioid use disorder     8/30/2024  5:53 AM Patti Zaragoza Add [F11.93] Opioid withdrawal (HCC)           ED Disposition       None          Follow-up Information    None       Patient's Medications   Discharge Prescriptions    No medications on file     No discharge procedures on file.       ED Provider  Electronically Signed by     Carlos Eduardo Flynn MD  08/30/24 [id number]